# Patient Record
Sex: FEMALE | Race: WHITE | NOT HISPANIC OR LATINO | Employment: FULL TIME | URBAN - METROPOLITAN AREA
[De-identification: names, ages, dates, MRNs, and addresses within clinical notes are randomized per-mention and may not be internally consistent; named-entity substitution may affect disease eponyms.]

---

## 2023-07-24 ENCOUNTER — TELEPHONE (OUTPATIENT)
Dept: TRANSPLANT | Facility: CLINIC | Age: 53
End: 2023-07-24

## 2023-07-24 NOTE — TELEPHONE ENCOUNTER
Attempted to return call to Radha, but no answer. Left  requesting a call back.    Donor referral opened.

## 2023-07-24 NOTE — TELEPHONE ENCOUNTER
----- Message from Kristi Butler sent at 7/24/2023  2:09 PM CDT -----  Regarding: Questions  Ms. Garcia is interested in being a living donor for patient Collin Garcia. She wants to know how to begin the process.      Radha (Pt's Niece) @ [phone]

## 2023-07-25 ENCOUNTER — TELEPHONE (OUTPATIENT)
Dept: TRANSPLANT | Facility: CLINIC | Age: 53
End: 2023-07-25

## 2023-07-25 NOTE — TELEPHONE ENCOUNTER
----- Message from Autumn Vivar sent at 7/25/2023  2:23 PM CDT -----  Regarding: returning missed call  The patient called returning call to [facility]  Please call at your convenience   No further information provided      Patient can be contacted @# [phone] (home)

## 2023-07-25 NOTE — TELEPHONE ENCOUNTER
Returned call to patient.    Contact initiated by pt via phone call to volunteer to be a potential donor for Collin Garcia. Chart opened/created & donor referral episode opened. Screening questions asked & answers received.     : 1970  AGE: 52    Outside age range.